# Patient Record
Sex: FEMALE | Race: WHITE | NOT HISPANIC OR LATINO | Employment: OTHER | URBAN - METROPOLITAN AREA
[De-identification: names, ages, dates, MRNs, and addresses within clinical notes are randomized per-mention and may not be internally consistent; named-entity substitution may affect disease eponyms.]

---

## 2019-04-30 DIAGNOSIS — H92.09 OTALGIA, UNSPECIFIED LATERALITY: Primary | ICD-10-CM

## 2019-04-30 DIAGNOSIS — H91.90 HEARING LOSS, UNSPECIFIED HEARING LOSS TYPE, UNSPECIFIED LATERALITY: ICD-10-CM

## 2019-05-07 ENCOUNTER — CLINICAL SUPPORT (OUTPATIENT)
Dept: AUDIOLOGY | Facility: CLINIC | Age: 74
End: 2019-05-07
Payer: MEDICARE

## 2019-05-07 ENCOUNTER — OFFICE VISIT (OUTPATIENT)
Dept: OTOLARYNGOLOGY | Facility: CLINIC | Age: 74
End: 2019-05-07
Payer: MEDICARE

## 2019-05-07 VITALS
TEMPERATURE: 98 F | SYSTOLIC BLOOD PRESSURE: 140 MMHG | HEART RATE: 59 BPM | BODY MASS INDEX: 21.54 KG/M2 | DIASTOLIC BLOOD PRESSURE: 63 MMHG | WEIGHT: 114 LBS

## 2019-05-07 DIAGNOSIS — J35.8 CRYPTIC TONSIL: Primary | ICD-10-CM

## 2019-05-07 DIAGNOSIS — J34.3 HYPERTROPHY OF BOTH INFERIOR NASAL TURBINATES: ICD-10-CM

## 2019-05-07 DIAGNOSIS — Z87.19 HISTORY OF GASTROESOPHAGEAL REFLUX (GERD): ICD-10-CM

## 2019-05-07 DIAGNOSIS — J02.9 SORE THROAT: ICD-10-CM

## 2019-05-07 DIAGNOSIS — H90.3 HEARING LOSS, SENSORINEURAL, HIGH FREQUENCY, BILATERAL: ICD-10-CM

## 2019-05-07 DIAGNOSIS — H92.03 OTALGIA OF BOTH EARS: ICD-10-CM

## 2019-05-07 DIAGNOSIS — H90.3 BILATERAL HIGH FREQUENCY SENSORINEURAL HEARING LOSS: Primary | ICD-10-CM

## 2019-05-07 DIAGNOSIS — J34.2 NASAL SEPTAL DEVIATION: ICD-10-CM

## 2019-05-07 PROCEDURE — 1101F PR PT FALLS ASSESS DOC 0-1 FALLS W/OUT INJ PAST YR: ICD-10-PCS | Mod: CPTII,S$GLB,, | Performed by: OTOLARYNGOLOGY

## 2019-05-07 PROCEDURE — 31575 DIAGNOSTIC LARYNGOSCOPY: CPT | Mod: S$GLB,,, | Performed by: OTOLARYNGOLOGY

## 2019-05-07 PROCEDURE — 92567 PR TYMPA2METRY: ICD-10-PCS | Mod: S$GLB,,, | Performed by: AUDIOLOGIST

## 2019-05-07 PROCEDURE — 99203 PR OFFICE/OUTPT VISIT, NEW, LEVL III, 30-44 MIN: ICD-10-PCS | Mod: 25,S$GLB,, | Performed by: OTOLARYNGOLOGY

## 2019-05-07 PROCEDURE — 31575 PR LARYNGOSCOPY, FLEXIBLE; DIAGNOSTIC: ICD-10-PCS | Mod: S$GLB,,, | Performed by: OTOLARYNGOLOGY

## 2019-05-07 PROCEDURE — 99999 PR PBB SHADOW E&M-EST. PATIENT-LVL III: ICD-10-PCS | Mod: PBBFAC,,, | Performed by: OTOLARYNGOLOGY

## 2019-05-07 PROCEDURE — 1101F PT FALLS ASSESS-DOCD LE1/YR: CPT | Mod: CPTII,S$GLB,, | Performed by: OTOLARYNGOLOGY

## 2019-05-07 PROCEDURE — 99999 PR PBB SHADOW E&M-EST. PATIENT-LVL III: CPT | Mod: PBBFAC,,, | Performed by: OTOLARYNGOLOGY

## 2019-05-07 PROCEDURE — 3077F PR MOST RECENT SYSTOLIC BLOOD PRESSURE >= 140 MM HG: ICD-10-PCS | Mod: CPTII,S$GLB,, | Performed by: OTOLARYNGOLOGY

## 2019-05-07 PROCEDURE — 3077F SYST BP >= 140 MM HG: CPT | Mod: CPTII,S$GLB,, | Performed by: OTOLARYNGOLOGY

## 2019-05-07 PROCEDURE — 3078F DIAST BP <80 MM HG: CPT | Mod: CPTII,S$GLB,, | Performed by: OTOLARYNGOLOGY

## 2019-05-07 PROCEDURE — 92557 PR COMPREHENSIVE HEARING TEST: ICD-10-PCS | Mod: S$GLB,,, | Performed by: AUDIOLOGIST

## 2019-05-07 PROCEDURE — 99203 OFFICE O/P NEW LOW 30 MIN: CPT | Mod: 25,S$GLB,, | Performed by: OTOLARYNGOLOGY

## 2019-05-07 PROCEDURE — 92557 COMPREHENSIVE HEARING TEST: CPT | Mod: S$GLB,,, | Performed by: AUDIOLOGIST

## 2019-05-07 PROCEDURE — 3078F PR MOST RECENT DIASTOLIC BLOOD PRESSURE < 80 MM HG: ICD-10-PCS | Mod: CPTII,S$GLB,, | Performed by: OTOLARYNGOLOGY

## 2019-05-07 PROCEDURE — 92567 TYMPANOMETRY: CPT | Mod: S$GLB,,, | Performed by: AUDIOLOGIST

## 2019-05-07 NOTE — PATIENT INSTRUCTIONS
Audiometry reviewed  Endoscopy performed  Anti GERD measures may help  Trial of NSS ( Flonase) 1-2 sprays @ lateral nostril once a day  Pt. may consult with Dr. SULLY Pitt re: allergy management [phone]  Possible TMJ otalgia

## 2019-05-07 NOTE — PROGRESS NOTES
Subjective:       Patient ID: Delaney Officer is a 74 y.o. female.    Chief Complaint: Sore Throat and Otalgia    HPI: Ms. Cazares is a 74 year old CF whose hand written reason for the visit today is throat problem, ear pain .  She indicates recent swelling in the left side of her throat.  She office examines and cleans her throat; she removed cryptic debris from her left tonsil with a plastic curette periodically.  She no sign infection the left side of her throat and loss she was undergoing a dental visit she was given a course of antibiotics i.e. Amoxil 500 mg twice a day.  She took the medication for 4 days in it due to GI side effects.  She indicates a 3-4 month history of bilateral ear pain is well. She thinks this may be related to a sinus condition.  She indicates some slight hoarseness symptoms.  She occasionally complains of GERD symptoms treated with PPI eyes in the past.   She was examined by the OBGYN physician 2017.  She has a history of breast cancer status post radiation with mastectomy and reconstruction 6 years later.  Her medical history also includes hypertension, anemia, hyperlipidemia and hypothyroidism.  She was examined in January in February this year for sinusitis and left otitis media conditions by the [facility] [place]. I believe she was prescribed antibiotics the cannot review the records from the visits.    PMH:  High blood pressure, thyroid disease, hayfever  Past Surgical History:   Procedure Laterality Date    APPENDECTOMY      bilateral tubal ligation       SECTION, CLASSIC       x2- first secondary to NRFHT    HERNIA REPAIR      MASTECTOMY      OOPHORECTOMY      TUBAL LIGATION      Family history:  Heart disease, mental illness, alcoholism, diabetes, arthritis  Allergies:  None known  Habits:  3 caffeinated drinks per day  Occupation:  Retired  ( [employer])     Review of Systems   Ears: Positive for ear pain, ear pressure, ringing in ear and  dizziness.    Nose:  Positive for postnasal drip.    Mouth/Throat: Positive for impaired swallowing, hoarse voice and oral ulcers.   Eyes:  Positive for visual change.   Cardiovascular:  Positive for history of high blood pressure.   Gastrointestinal:  Positive for history of stomach ulcers or pain.   Other:  Negative for rash.     Current Outpatient Medications on File Prior to Visit   Medication Sig Dispense Refill    amiloride (MIDAMOR) 5 MG Tab Take 5 mg by mouth once daily.      aspirin (ECOTRIN) 81 MG EC tablet Take 81 mg by mouth once daily.        fluticasone (FLONASE) 50 mcg/actuation nasal spray 2 sprays by Nasal route Twice daily. 2 Spray, Suspension Nasal Twice a day       guaifenesin (MUCINEX) 1,200 mg TM12 Take 1 tablet by mouth 2 (two) times daily. 20 tablet 0    quinapril (ACCUPRIL) 20 MG tablet TAKE ONE TABLET BY MOUTH TWICE DAILY 180 tablet 0    SYNTHROID 88 mcg tablet TAKE ONE TABLET BY MOUTH EVERY DAY 30 tablet 2    azelastine (ASTELIN) 137 mcg nasal spray 1 spray (137 mcg total) by Nasal route 2 (two) times daily. 30 mL 0    estradiol (ESTRING) 2 mg (7.5 mcg /24 hour) vaginal ring Place 2 mg vaginally once. 1 each 3    hydrochlorothiazide (HYDRODIURIL) 25 MG tablet Take 1 tablet (25 mg total) by mouth once daily. 90 tablet 0     No current facility-administered medications on file prior to visit.            .The   patient completed an audiometric study performed by the [facility] audiology service.  The study is duplicated below and the results are reviewed with her in detail  Objective:       Blood pressure 140/63 pulse 59 temperature 98° height 5 ft 1 in weight 114 lb  General:  Alert and oriented bespectaclded lady in no acute distress  The patient consented to an endoscopic study today which has been explained to her in detail.  She is transferred into the endoscopy suite.  Scope number [id number] was used.  Pictures are recorded through the device.  Procedure:  4% xylocaine and  Cole-Synephrine sprayed in each nasal passage x 2.  Cetacaine was applied to the posterior pharynx x2.  After waiting several minutes scoping is performed.  The right nasal passage is used as the conduit for the study.  There is evidence for left posterior septal deflection with near impaction into the turbinate.  There is no evidence of polypoid disease or purulent discharge in either passage.  Nasopharyngeal tissues are not significantly inflamed.  The some smooth lymphoid tissue noted at the base of the tongue left side > right.  There is no evidence of inferior tonsil pole exudate or inflammation on the left side of the hypopharynx.  The epiglottis appears within normal limits.  The supraglottic tissues appear within normal limits.  Piriform sinuses are clear.  There is no evidence of true vocal cord paresis or paralysis.  There is no evidence of true vocal cord leukoplakia.  The oral cavity is inspected with the scope.  A prominent circumvallate papillae is noted in the posterior left lateral tongue area, within normal limits.  There is no evidence of left peritonsillitis, exudative tonsillitis or cryptic debris noted. Scope was withdrawn.  Physical Exam   Constitutional: She is oriented to person, place, and time. She appears well-developed and well-nourished.   HENT:   Head: Normocephalic.   Right Ear: Tympanic membrane and external ear normal. No drainage. No foreign bodies. No mastoid tenderness. Tympanic membrane is not perforated. No decreased hearing is noted.   Left Ear: Tympanic membrane and external ear normal. No drainage. No foreign bodies. No mastoid tenderness. Tympanic membrane is not perforated. No decreased hearing is noted.   Ears:    Nose: Nose normal. No nasal deformity, septal deviation or nasal septal hematoma. No epistaxis. Right sinus exhibits no maxillary sinus tenderness and no frontal sinus tenderness. Left sinus exhibits no maxillary sinus tenderness and no frontal sinus tenderness.        Mouth/Throat: Uvula is midline, oropharynx is clear and moist and mucous membranes are normal. No oral lesions. No trismus in the jaw. No uvula swelling. No oropharyngeal exudate or tonsillar abscesses.       Neck: Neck supple. No tracheal deviation present. No thyromegaly present.   Pulmonary/Chest: Effort normal. No stridor.   Lymphadenopathy:     She has no cervical adenopathy.   Neurological: She is alert and oriented to person, place, and time.   Skin: No rash noted.       Assessment:       1. Cryptic tonsil, left; no evidence of peritonsillitis, tonsil exudate, tonsil cyst or significant inflammation    2. Sore throat    3. History of gastroesophageal reflux (GERD)    4. Otalgia of both ears    5. Hypertrophy of both inferior nasal turbinates    6. Hearing loss, sensorineural, high frequency, bilateral    7. Nasal septal deviation; left /posterior        Plan:     Audiometry reviewed  Endoscopy performed  Anti GERD measures may help  Trial of NSS ( Flonase) 1-2 sprays @ lateral nostril once a day  Pt. may consult with Dr. SULLY Pitt re: allergy management [phone]  Possible TMJ otalgia

## 2019-05-07 NOTE — PROGRESS NOTES
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 She reported intermittent bilateral ear pain and bilateral tinnitus.     Audiological testing revealed essentially normal hearing sensitivity with the exception of a moderate hearing loss at 8 kHz in the right ear and a mild hearing loss from 6-8 kHz in the left ear. A speech reception threshold was obtained at 10 dBHL, bilaterally. Speech discrimination was 100%, bilaterally.    Tympanometry revealed Type A tympanograms, bilaterally.    Recommendations:  1. Otologic evaluation  2. Annual hearing evaluation  3. Noise protection

## 2021-04-16 ENCOUNTER — PATIENT MESSAGE (OUTPATIENT)
Dept: RESEARCH | Facility: HOSPITAL | Age: 76
End: 2021-04-16

## 2022-07-11 ENCOUNTER — OFFICE VISIT (OUTPATIENT)
Dept: OTOLARYNGOLOGY | Facility: CLINIC | Age: 77
End: 2022-07-11
Payer: MEDICARE

## 2022-07-11 VITALS — DIASTOLIC BLOOD PRESSURE: 69 MMHG | SYSTOLIC BLOOD PRESSURE: 148 MMHG | HEART RATE: 61 BPM

## 2022-07-11 DIAGNOSIS — Z78.9 HISTORY OF EXCESSIVE CERUMEN: Primary | ICD-10-CM

## 2022-07-11 DIAGNOSIS — H61.23 IMPACTED CERUMEN, BILATERAL: ICD-10-CM

## 2022-07-11 DIAGNOSIS — H61.893 DRY BOTH EAR CANALS: ICD-10-CM

## 2022-07-11 PROCEDURE — 3078F DIAST BP <80 MM HG: CPT | Mod: CPTII,S$GLB,, | Performed by: OTOLARYNGOLOGY

## 2022-07-11 PROCEDURE — 3077F SYST BP >= 140 MM HG: CPT | Mod: CPTII,S$GLB,, | Performed by: OTOLARYNGOLOGY

## 2022-07-11 PROCEDURE — 1125F PR PAIN SEVERITY QUANTIFIED, PAIN PRESENT: ICD-10-PCS | Mod: CPTII,S$GLB,, | Performed by: OTOLARYNGOLOGY

## 2022-07-11 PROCEDURE — 1159F MED LIST DOCD IN RCRD: CPT | Mod: CPTII,S$GLB,, | Performed by: OTOLARYNGOLOGY

## 2022-07-11 PROCEDURE — 3078F PR MOST RECENT DIASTOLIC BLOOD PRESSURE < 80 MM HG: ICD-10-PCS | Mod: CPTII,S$GLB,, | Performed by: OTOLARYNGOLOGY

## 2022-07-11 PROCEDURE — 1101F PT FALLS ASSESS-DOCD LE1/YR: CPT | Mod: CPTII,S$GLB,, | Performed by: OTOLARYNGOLOGY

## 2022-07-11 PROCEDURE — 99999 PR PBB SHADOW E&M-EST. PATIENT-LVL III: CPT | Mod: PBBFAC,,, | Performed by: OTOLARYNGOLOGY

## 2022-07-11 PROCEDURE — 1125F AMNT PAIN NOTED PAIN PRSNT: CPT | Mod: CPTII,S$GLB,, | Performed by: OTOLARYNGOLOGY

## 2022-07-11 PROCEDURE — 1159F PR MEDICATION LIST DOCUMENTED IN MEDICAL RECORD: ICD-10-PCS | Mod: CPTII,S$GLB,, | Performed by: OTOLARYNGOLOGY

## 2022-07-11 PROCEDURE — 3288F FALL RISK ASSESSMENT DOCD: CPT | Mod: CPTII,S$GLB,, | Performed by: OTOLARYNGOLOGY

## 2022-07-11 PROCEDURE — 99999 PR PBB SHADOW E&M-EST. PATIENT-LVL III: ICD-10-PCS | Mod: PBBFAC,,, | Performed by: OTOLARYNGOLOGY

## 2022-07-11 PROCEDURE — 1101F PR PT FALLS ASSESS DOC 0-1 FALLS W/OUT INJ PAST YR: ICD-10-PCS | Mod: CPTII,S$GLB,, | Performed by: OTOLARYNGOLOGY

## 2022-07-11 PROCEDURE — 99499 NO LOS: ICD-10-PCS | Mod: S$GLB,,, | Performed by: OTOLARYNGOLOGY

## 2022-07-11 PROCEDURE — 3077F PR MOST RECENT SYSTOLIC BLOOD PRESSURE >= 140 MM HG: ICD-10-PCS | Mod: CPTII,S$GLB,, | Performed by: OTOLARYNGOLOGY

## 2022-07-11 PROCEDURE — 69210 PR REMOVAL IMPACTED CERUMEN REQUIRING INSTRUMENTATION, UNILATERAL: ICD-10-PCS | Mod: S$GLB,,, | Performed by: OTOLARYNGOLOGY

## 2022-07-11 PROCEDURE — 3288F PR FALLS RISK ASSESSMENT DOCUMENTED: ICD-10-PCS | Mod: CPTII,S$GLB,, | Performed by: OTOLARYNGOLOGY

## 2022-07-11 PROCEDURE — 99499 UNLISTED E&M SERVICE: CPT | Mod: S$GLB,,, | Performed by: OTOLARYNGOLOGY

## 2022-07-11 PROCEDURE — 69210 REMOVE IMPACTED EAR WAX UNI: CPT | Mod: S$GLB,,, | Performed by: OTOLARYNGOLOGY

## 2022-07-11 NOTE — PATIENT INSTRUCTIONS
A veneer of dry cerumen is removed from the outer 1/3 of both eacs with a curette; large volume of cerumen is removed from the left ear canal  Audiometry encouraged later this year on return

## 2023-06-15 ENCOUNTER — OFFICE VISIT (OUTPATIENT)
Dept: OTOLARYNGOLOGY | Facility: CLINIC | Age: 78
End: 2023-06-15
Payer: MEDICARE

## 2023-06-15 DIAGNOSIS — H61.22 IMPACTED CERUMEN OF LEFT EAR: Primary | ICD-10-CM

## 2023-06-15 PROCEDURE — 99499 NO LOS: ICD-10-PCS | Mod: S$GLB,,,

## 2023-06-15 PROCEDURE — 99999 PR PBB SHADOW E&M-EST. PATIENT-LVL III: ICD-10-PCS | Mod: PBBFAC,,,

## 2023-06-15 PROCEDURE — 69210 REMOVE IMPACTED EAR WAX UNI: CPT | Mod: S$GLB,,,

## 2023-06-15 PROCEDURE — 99499 UNLISTED E&M SERVICE: CPT | Mod: S$GLB,,,

## 2023-06-15 PROCEDURE — 99999 PR PBB SHADOW E&M-EST. PATIENT-LVL III: CPT | Mod: PBBFAC,,,

## 2023-06-15 PROCEDURE — 69210 PR REMOVAL IMPACTED CERUMEN REQUIRING INSTRUMENTATION, UNILATERAL: ICD-10-PCS | Mod: S$GLB,,,

## 2023-06-15 RX ORDER — AMOXICILLIN 875 MG/1
875 TABLET, FILM COATED ORAL 2 TIMES DAILY
COMMUNITY
Start: 2023-01-12

## 2023-06-15 NOTE — PROGRESS NOTES
Left cerumen impaction    Patient denies any otalgia or drainage.    Procedure Note:    The patient was brought to the minor procedure room and placed under the operating microscope. Using a combination of suction, curettes and cup forceps the patient's cerumen impaction was removed from left eac. The tympanic membrane was evaluated and was unremarkable. The patient tolerated the procedure well. There were no complications.      RTC as needed or if symptoms persist.  Questions answered.   
trauma

## 2025-03-26 ENCOUNTER — OFFICE VISIT (OUTPATIENT)
Dept: OTOLARYNGOLOGY | Facility: CLINIC | Age: 80
End: 2025-03-26
Payer: MEDICARE

## 2025-03-26 ENCOUNTER — CLINICAL SUPPORT (OUTPATIENT)
Dept: AUDIOLOGY | Facility: CLINIC | Age: 80
End: 2025-03-26
Payer: MEDICARE

## 2025-03-26 DIAGNOSIS — H90.3 SENSORINEURAL HEARING LOSS OF BOTH EARS: ICD-10-CM

## 2025-03-26 DIAGNOSIS — H90.3 SENSORINEURAL HEARING LOSS (SNHL), BILATERAL: ICD-10-CM

## 2025-03-26 DIAGNOSIS — H61.23 BILATERAL IMPACTED CERUMEN: Primary | ICD-10-CM

## 2025-03-26 DIAGNOSIS — H93.13 TINNITUS OF BOTH EARS: ICD-10-CM

## 2025-03-26 DIAGNOSIS — H93.13 TINNITUS OF BOTH EARS: Primary | ICD-10-CM

## 2025-03-26 PROCEDURE — 92567 TYMPANOMETRY: CPT | Mod: S$GLB,,, | Performed by: AUDIOLOGIST

## 2025-03-26 PROCEDURE — 99999 PR PBB SHADOW E&M-EST. PATIENT-LVL III: CPT | Mod: PBBFAC,,,

## 2025-03-26 PROCEDURE — 92557 COMPREHENSIVE HEARING TEST: CPT | Mod: S$GLB,,, | Performed by: AUDIOLOGIST

## 2025-03-26 NOTE — PROGRESS NOTES
Ms. Baltazar Officer was seen in the clinic today for an audiological evaluation.  Ms. Cazares reported tinnitus of both ears.    Audiological testing revealed normal hearing sloping to a mild to moderately-severe sensorineural hearing loss for the right ear and normal hearing sloping to a mild to moderately-severe sensorineural hearing loss for the left ear.  A speech reception threshold was obtained at 30 dBHL for the right ear and at 20 dBHL for the left ear.  Speech discrimination was 96% for the right ear and 92% for the left ear.      Tympanometry testing revealed a Type A tympanogram for the right ear and a Type A tympanogram for the left ear.      Recommendations:  1. Otologic evaluation  2. Annual audiological evaluation  3. Hearing protection when in noise   4. Hearing aid consultation

## 2025-03-26 NOTE — PROGRESS NOTES
Subjective:   Delaney Officer is a 79 y.o. female who presents today for ear evaluation. Patient reports history of cerumen impaction requiring occasional cleanings. Last audio here in 2019 with mild-moderate high frequency hearing loss. She reports a long standing history of tinnitus in both ears which she feels has worsened the last few years and has become constant. No new hearing concerns. She denies ear pain. There is not a prior history of ear surgery. There is not a prior history of ear infections. There is not a history of ear trauma. She denies a history of significant noise exposure.     Past Medical History  She has a past medical history of Anemia, Breast cancer, Cancer, HTN (hypertension), breast cancer, Hyperlipidemia, Hypothyroidism, and Osteopenia.    Past Surgical History  She has a past surgical history that includes  section, classic; bilateral tubal ligation; Hernia repair; Appendectomy; Tubal ligation; Mastectomy; and Oophorectomy.    Family History  Her family history includes Cancer (age of onset: 25) in her sister.    Social History  She reports that she has never smoked. She does not have any smokeless tobacco history on file. She reports that she does not drink alcohol and does not use drugs.    Allergies  She has no known allergies.    Medications  She has a current medication list which includes the following prescription(s): amiloride, amoxicillin, aspirin, azelastine, estradiol, fluticasone propionate, guaifenesin, hydrochlorothiazide, quinapril, and synthroid.    Review of Systems     Constitutional: Positive for fatigue.      HENT: Positive for hearing loss, ringing in the ears, sinus pressure and sore throat.  Negative for ear discharge, ear infection and ear pain.      Eyes:  Negative for change in eyesight, eye drainage, eye itching and photophobia.     Respiratory:  Negative for cough, shortness of breath, sleep apnea, snoring and wheezing.      Cardiovascular:  Negative for  chest pain, foot swelling, irregular heartbeat and swollen veins.     Gastrointestinal:  Negative for abdominal pain, acid reflux, constipation, diarrhea, heartburn and vomiting.     Genitourinary: Positive for frequent urination.     Musc: Positive for back pain and neck pain.     Skin: Negative for rash.     Allergy: Negative for food allergies and seasonal allergies.     Endocrine: Negative for cold intolerance and heat intolerance.      Neurological: Positive for dizziness.     Hematologic: Negative for bruises/bleeds easily and swollen glands.      Psychiatric: Negative for decreased concentration, depression, nervous/anxious and sleep disturbance.          Objective:       Head:  Normocephalic and atraumatic.     Ears:    Right Ear: No drainage, swelling or tenderness. Tympanic membrane is not injected, not scarred, not perforated, not erythematous, not retracted and not bulging. No middle ear effusion.   Left Ear: No drainage, swelling or tenderness. Tympanic membrane is not injected, not scarred, not perforated, not erythematous, not retracted and not bulging.  No middle ear effusion.   Narrow EAC AU.   Ceruminous debris obstructing bilateral TM, removed via microscopy.     Pulmonary/Chest:   Effort normal.       Procedure  Cerumen removal performed.  See procedure note.  Procedure Note:  The patient was brought to the minor procedure room and placed under the operating microscope of the right and left ear canal which was cleaned of ceruminous debris. Using a combination of suction, curettes and cup forceps the patient's cerumen was removed. The patient tolerated the procedure well. There were no complications.     Audiology       I independently reviewed the tracings of the complete audiometric evaluation performed today. I reviewed the audiogram with the patient as well. Pertinent findings include: mild sloping to moderately severe SNHL AU. SRT 30 dBHL AD and 20 dBHL AS. Speech discrimination 96% AD and  92% AS. Type A tympanogram AU.      Assessment:     1. Bilateral impacted cerumen    2. Sensorineural hearing loss (SNHL), bilateral    3. Tinnitus of both ears      Plan:   Delaney was seen today for cerumen impaction.  Diagnoses and all orders for this visit:    Bilateral impacted cerumen  Cerumen impaction removed under microscopy. Patient tolerated procedure well and noted improvement upon impaction removal. Proper ear hygiene discussed.  Including importance of cerumen for health of external auditory canal and risks of using q-tips including itching, trauma, outer ear infections, and tympanic membrane perforation.     Sensorineural hearing loss (SNHL), bilateral  Audiometric testing interpretation consistent with sensorineural hearing loss. Discussed the etiology of SNHL. Medically cleared for hearing amplification, and will follow-up with Audiology if interested. Hearing conservation in noisy environments. RTC in 1-2 years for routine audiogram or sooner if needed.     Tinnitus of both ears  Discussed the etiology of tinnitus and management strategies including the use of background sound enrichment. Further instructed that avoidance of excess caffeine, alcohol, tobacco, sodium and stress can be of benefit.

## 2025-05-20 DIAGNOSIS — N64.4 BREAST PAIN: Primary | ICD-10-CM

## 2025-05-21 ENCOUNTER — TELEPHONE (OUTPATIENT)
Dept: HEMATOLOGY/ONCOLOGY | Facility: CLINIC | Age: 80
End: 2025-05-21
Payer: MEDICARE

## 2025-05-21 NOTE — TELEPHONE ENCOUNTER
----- Message from Violet Gold MD sent at 5/20/2025  4:21 PM CDT -----  Patient's wifeStates hx of breast cancer on left and now with right breast painNo longer established with OncologyRight diag mammo order placedCan we get scheduled?We can take next steps based on resultsThanks!

## 2025-05-26 ENCOUNTER — HOSPITAL ENCOUNTER (OUTPATIENT)
Dept: RADIOLOGY | Facility: HOSPITAL | Age: 80
Discharge: HOME | End: 2025-05-26
Attending: INTERNAL MEDICINE
Payer: MEDICARE

## 2025-05-26 DIAGNOSIS — N64.4 BREAST PAIN: ICD-10-CM

## 2025-05-26 PROCEDURE — 77066 DX MAMMO INCL CAD BI: CPT | Mod: 26,,, | Performed by: RADIOLOGY

## 2025-05-26 PROCEDURE — 77062 BREAST TOMOSYNTHESIS BI: CPT | Mod: 26,,, | Performed by: RADIOLOGY

## 2025-05-26 PROCEDURE — 77066 DX MAMMO INCL CAD BI: CPT | Mod: TC
